# Patient Record
Sex: MALE | Race: BLACK OR AFRICAN AMERICAN | NOT HISPANIC OR LATINO | URBAN - METROPOLITAN AREA
[De-identification: names, ages, dates, MRNs, and addresses within clinical notes are randomized per-mention and may not be internally consistent; named-entity substitution may affect disease eponyms.]

---

## 2017-09-10 ENCOUNTER — EMERGENCY (EMERGENCY)
Facility: HOSPITAL | Age: 27
LOS: 1 days | Discharge: HOME | End: 2017-09-10
Attending: EMERGENCY MEDICINE | Admitting: EMERGENCY MEDICINE
Payer: MEDICAID

## 2017-09-10 VITALS
RESPIRATION RATE: 15 BRPM | TEMPERATURE: 98 F | DIASTOLIC BLOOD PRESSURE: 61 MMHG | HEART RATE: 62 BPM | SYSTOLIC BLOOD PRESSURE: 117 MMHG | OXYGEN SATURATION: 99 %

## 2017-09-10 VITALS
HEART RATE: 62 BPM | OXYGEN SATURATION: 100 % | SYSTOLIC BLOOD PRESSURE: 137 MMHG | RESPIRATION RATE: 18 BRPM | TEMPERATURE: 98 F | DIASTOLIC BLOOD PRESSURE: 84 MMHG

## 2017-09-10 PROCEDURE — 99283 EMERGENCY DEPT VISIT LOW MDM: CPT | Mod: 25

## 2017-09-10 RX ORDER — OXYCODONE AND ACETAMINOPHEN 5; 325 MG/1; MG/1
1 TABLET ORAL ONCE
Qty: 0 | Refills: 0 | Status: DISCONTINUED | OUTPATIENT
Start: 2017-09-10 | End: 2017-09-10

## 2017-09-10 RX ADMIN — OXYCODONE AND ACETAMINOPHEN 1 TABLET(S): 5; 325 TABLET ORAL at 06:42

## 2017-09-10 NOTE — ED PROVIDER NOTE - OBJECTIVE STATEMENT
25 yo M with history of asthma presenting with left upper molar pain x 2 weeks. Pt tried aspirin with minimal relief. Denies fevers, chills, cough, rhinorrhea, otorrhea, otalgia, nausea, vomiting, constipation, diarrhea, chest pain, shortness of breath or changes in urinary habits.

## 2017-09-10 NOTE — ED ADULT NURSE NOTE - OBJECTIVE STATEMENT
Pt received AxOx4, ambulatory, c/o toothache on the L upper jaw. Pt states the pain woke him up earlier today. Denies any other pain/discomfort at this time. ED providers are at bedside evaluating. No acute distress noted at this time. VS are as noted. Will continue to monitor.

## 2017-09-10 NOTE — ED PROVIDER NOTE - ATTENDING CONTRIBUTION TO CARE
26M with pmh of asthma presents with L upper molar pain x 2 weeks. Without f/c, n/v/d, trismus, diff swallowing, sob, neck pain or sore throat. PE significant for poor dentition, no palpable ttp, no palpable fluctuance concerning for abscess, oropharynx clear without exudate, no lymphadenopathy or neck ttp, no trismus. Without findings concerning for abscess or other acute dental complication. Will give analgesia, have f/u with dental, discussed proper follow-up and indications to return to ED. - Sung Holland MD

## 2017-09-18 ENCOUNTER — EMERGENCY (EMERGENCY)
Facility: HOSPITAL | Age: 27
LOS: 1 days | Discharge: HOME | End: 2017-09-18
Admitting: EMERGENCY MEDICINE
Payer: MEDICAID

## 2017-09-18 VITALS
TEMPERATURE: 98 F | RESPIRATION RATE: 16 BRPM | SYSTOLIC BLOOD PRESSURE: 127 MMHG | DIASTOLIC BLOOD PRESSURE: 72 MMHG | OXYGEN SATURATION: 100 % | HEART RATE: 54 BPM

## 2017-09-18 PROCEDURE — 99284 EMERGENCY DEPT VISIT MOD MDM: CPT | Mod: 25

## 2017-09-18 RX ORDER — OXYCODONE AND ACETAMINOPHEN 5; 325 MG/1; MG/1
1 TABLET ORAL ONCE
Qty: 0 | Refills: 0 | Status: DISCONTINUED | OUTPATIENT
Start: 2017-09-18 | End: 2017-09-18

## 2017-09-18 RX ADMIN — OXYCODONE AND ACETAMINOPHEN 1 TABLET(S): 5; 325 TABLET ORAL at 03:06

## 2017-09-18 NOTE — ED PROVIDER NOTE - NS ED MD DISPO DISCHARGE CCDA
Apollo Hernandes  [address] [facility] [id number]            9/6/2017      Dear Apollo,    This letter is a reminder that you are due for a colonoscopy. I would like to share some important information about colorectal cancer. Colorectal cancer is a leading cause of cancer death in this country. Colorectal cancer can be stopped in its tracks or detected early with preventative testing. Please call [phone] to schedule an appointment with Dr. Self, Dr. Ulrich, Dr. Cervantes, or Dr. Acosta which may be scheduled at [facility] in [place] or [facility] in [place].    Your good health is important to us-colorectal cancer screening is important for you.     Sincerely,    Dr. Isaías Cervantes M.D.  Gastroenterology  [facility]  [address] [place] [address]   Patient/Caregiver provided printed discharge information.

## 2017-09-18 NOTE — ED PROVIDER NOTE - PROGRESS NOTE DETAILS
MONISHA PUCKETT:  No prescribed narcotics noted on I stop Reference Number: [id number].  Pt medically stable for discharge. Pt to follow up in dental clinic at his appointment at [facility].

## 2017-09-18 NOTE — ED PROVIDER NOTE - PLAN OF CARE
Rest, drink plenty of fluids.  Advance activity as tolerated.  Continue all previously prescribed medications as directed.  Follow up at your dental clinic appointment today --  bring copies of your results.  Return to the ER for worsening or persistent symptoms, including but not limited to fevers, chills, facial swelling, and/or ANY NEW OR CONCERNING SYMPTOMS. If you have issues obtaining follow up, please call: [phone] to obtain a doctor or specialist who takes your insurance in your area.

## 2017-09-18 NOTE — ED PROVIDER NOTE - MEDICAL DECISION MAKING DETAILS
Pt is a 27 y/o M smoker no PMHx p/w dental pain x 4 days -- dental caries; no e/o dental abscess -- pain control, extraction today at [facility]

## 2017-09-18 NOTE — ED PROVIDER NOTE - CARE PLAN
Principal Discharge DX:	Dental caries  Instructions for follow-up, activity and diet:	Rest, drink plenty of fluids.  Advance activity as tolerated.  Continue all previously prescribed medications as directed.  Follow up at your dental clinic appointment today --  bring copies of your results.  Return to the ER for worsening or persistent symptoms, including but not limited to fevers, chills, facial swelling, and/or ANY NEW OR CONCERNING SYMPTOMS. If you have issues obtaining follow up, please call: [phone] to obtain a doctor or specialist who takes your insurance in your area.

## 2017-09-18 NOTE — ED PROVIDER NOTE - OBJECTIVE STATEMENT
Pt is a 27 y/o M smoker no PMHx p/w dental pain x 4 days.  Pt notes chronic pain (several months) at two teeth (left maxillary).  Pt notes exacerbation of pain prompting ED visit 1 week ago.  Pt notes recurrence of pain for past 4 days.  Three days ago, pt went to [facility] ED and is scheduled to have extraction at [facility] at [facility] today.  Pt states pain has become worse this evening, despite take ibuprofen.  Denies any fevers, chills, throat pain, gum swelling, facial swelling, trauma.

## 2017-09-18 NOTE — ED ADULT TRIAGE NOTE - CHIEF COMPLAINT QUOTE
Pt comes in for c/o left side facial/dental pain that has been going on for a while. Pt appears uncomfortable in triage, holding face, vs as noted.

## 2017-09-18 NOTE — ED PROVIDER NOTE - CHPI ED SYMPTOMS NEG
no decreased eating/drinking/no headache/no nasal congestion/no mouth sores/no fever/no ear pain/no bleeding gums

## 2018-04-01 ENCOUNTER — EMERGENCY (EMERGENCY)
Facility: HOSPITAL | Age: 28
LOS: 1 days | Discharge: HOME | End: 2018-04-01
Attending: EMERGENCY MEDICINE | Admitting: EMERGENCY MEDICINE
Payer: MEDICAID

## 2018-04-01 VITALS
RESPIRATION RATE: 18 BRPM | OXYGEN SATURATION: 99 % | HEART RATE: 67 BPM | DIASTOLIC BLOOD PRESSURE: 72 MMHG | SYSTOLIC BLOOD PRESSURE: 117 MMHG | TEMPERATURE: 98 F

## 2018-04-01 PROCEDURE — 99283 EMERGENCY DEPT VISIT LOW MDM: CPT | Mod: 25

## 2018-04-01 RX ORDER — IPRATROPIUM/ALBUTEROL SULFATE 18-103MCG
3 AEROSOL WITH ADAPTER (GRAM) INHALATION ONCE
Qty: 0 | Refills: 0 | Status: COMPLETED | OUTPATIENT
Start: 2018-04-01 | End: 2018-04-01

## 2018-04-01 RX ADMIN — Medication 5 MILLIGRAM(S): at 07:55

## 2018-04-01 RX ADMIN — Medication 3 MILLILITER(S): at 07:55

## 2018-04-01 NOTE — ED PROVIDER NOTE - MEDICAL DECISION MAKING DETAILS
26yo M with PMH asthma presenting with 2 days of chest tightness and wheezing awakening him from sleep this AM, no controller medications at home, currently breathing comfortably w/o wheezing, Duoneb, provide albuterol inhaler, asthma education, home.

## 2018-04-01 NOTE — ED ADULT NURSE NOTE - OBJECTIVE STATEMENT
26yo M with PMH asthma presenting with 2 days of chest tightness and wheezing awakening him from sleep never requiring intubation or hospitalization. lost his inhaler.

## 2018-04-01 NOTE — ED PROVIDER NOTE - ATTENDING CONTRIBUTION TO CARE
26 y/o M with h/o mild, intermittent asthma (no previous hospitalizations) on albuterol prn here with chest tightness similar to previous asthma exacerbations.  No fever, cough, cp, back pain, leg pain or swelling.  Pt states he ran out of his inhaler.  No travel, sick contacts, or recent illness.  Well appearing, lying comfortably in stretcher, awake and alert, nontoxic.  VSS.  Pt is not hypoxic, no tahcypnea, speaking in full sentences, there is no increased work of breathing.  Lungs cta bl with good air entry and very mild exp wheeze to upper lung fields.  Cards nl S1/S2, RRR, no MRG.  Abd soft ntnd.  No pedal edema or calf tenderness.  Mild asthma exacerbation, no infectious complaints concerning for underlying pna.  Will give albuterol, steroid course, outpatient follow-up.

## 2018-04-01 NOTE — ED ADULT TRIAGE NOTE - CHIEF COMPLAINT QUOTE
asthma    pt states lost his mdi...feels tight for a few days.  lungs cta. oliver cute distress.. able to speak in full sentneces

## 2018-04-01 NOTE — ED PROVIDER NOTE - CHPI ED SYMPTOMS NEG
no body aches/no chest pain/no cough/no hemoptysis/no shortness of breath/no chills/no edema/no headache/no diaphoresis

## 2018-04-01 NOTE — ED PROVIDER NOTE - OBJECTIVE STATEMENT
28yo M with PMH asthma presenting with 2 days of chest tightness and wheezing awakening him from sleep this AM. He has had minor exacerbations in the past, never requiring intubation or hospitalization. He is on no medications at home, reporting that he lost his inhaler. He has no PMD or pulmonologist. He denies tobacco/marijuana smoking or clear triggers for asthma. 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 He denies cough, fevers, chills, shortness of breath currently, wheezing currently, LE pain or swelling, history of heart problems, family history of heart problems.

## 2018-04-01 NOTE — ED PROVIDER NOTE - CONSTITUTIONAL, MLM
normal... well appearing sitting up in bed, speaking in full sentences, breathing comfortably, malodorous

## 2018-04-08 ENCOUNTER — EMERGENCY (EMERGENCY)
Facility: HOSPITAL | Age: 28
LOS: 1 days | Discharge: HOME | End: 2018-04-08
Attending: EMERGENCY MEDICINE | Admitting: EMERGENCY MEDICINE
Payer: MEDICAID

## 2018-04-08 VITALS
HEART RATE: 75 BPM | RESPIRATION RATE: 18 BRPM | OXYGEN SATURATION: 95 % | TEMPERATURE: 98 F | DIASTOLIC BLOOD PRESSURE: 76 MMHG | SYSTOLIC BLOOD PRESSURE: 119 MMHG

## 2018-04-08 DIAGNOSIS — K08.89 OTHER SPECIFIED DISORDERS OF TEETH AND SUPPORTING STRUCTURES: ICD-10-CM

## 2018-04-08 PROCEDURE — 99283 EMERGENCY DEPT VISIT LOW MDM: CPT | Mod: 25

## 2018-04-08 RX ORDER — IBUPROFEN 200 MG
600 TABLET ORAL ONCE
Qty: 0 | Refills: 0 | Status: COMPLETED | OUTPATIENT
Start: 2018-04-08 | End: 2018-04-08

## 2018-04-08 RX ORDER — OXYCODONE AND ACETAMINOPHEN 5; 325 MG/1; MG/1
1 TABLET ORAL ONCE
Qty: 0 | Refills: 0 | Status: DISCONTINUED | OUTPATIENT
Start: 2018-04-08 | End: 2018-04-08

## 2018-04-08 RX ADMIN — OXYCODONE AND ACETAMINOPHEN 1 TABLET(S): 5; 325 TABLET ORAL at 09:39

## 2018-04-08 RX ADMIN — Medication 600 MILLIGRAM(S): at 09:39

## 2018-04-08 NOTE — ED PROVIDER NOTE - PHYSICAL EXAMINATION
GEN: Well appearing, well nourished, awake, alert, oriented to person, place, time/situation and in no apparent distress.  ENT: Airway patent, Nasal mucosa clear. Mouth with normal mucosa.  +Decay to tooth #18.  No abscess.   EYES: Clear bilaterally.  RESPIRATORY: Breathing comfortably with normal RR.  MSK: Range of motion is not limited, no deformities noted.  NEURO: Alert and oriented, no focal deficits.  SKIN: Skin normal color for race, warm, dry and intact. No evidence of rash.  PSYCH: Alert and oriented to person, place, time/situation. normal mood and affect. no apparent risk to self or others.

## 2018-04-08 NOTE — ED PROVIDER NOTE - OBJECTIVE STATEMENT
Pt is a 28yo M with no PMH who reports pain to L lower posterior molar x 2 days.  Denies any swelling or bleeding.  Pain is dull ache and does not radiate.  Worse with any pressure.  Denies taking any pain medication.

## 2018-04-08 NOTE — ED PROVIDER NOTE - MEDICAL DECISION MAKING DETAILS
Toothache.  no e/o abscess.  Pt has not been evaluated by dentist.  Will give pain medication now and will have him f/u with [facility] walk in dental clinic tomorrow morning at 830am.  Pt understands he must show up early because it's first come first serve and that failure to f/u will results in prolonged pain and possible infection.

## 2018-04-09 ENCOUNTER — EMERGENCY (EMERGENCY)
Facility: HOSPITAL | Age: 28
LOS: 1 days | Discharge: HOME | End: 2018-04-09
Attending: EMERGENCY MEDICINE | Admitting: EMERGENCY MEDICINE
Payer: MEDICAID

## 2018-04-09 VITALS
HEART RATE: 63 BPM | DIASTOLIC BLOOD PRESSURE: 79 MMHG | RESPIRATION RATE: 17 BRPM | SYSTOLIC BLOOD PRESSURE: 121 MMHG | TEMPERATURE: 98 F | OXYGEN SATURATION: 97 %

## 2018-04-09 DIAGNOSIS — K02.9 DENTAL CARIES, UNSPECIFIED: ICD-10-CM

## 2018-04-09 DIAGNOSIS — Z91.010 ALLERGY TO PEANUTS: ICD-10-CM

## 2018-04-09 DIAGNOSIS — K08.89 OTHER SPECIFIED DISORDERS OF TEETH AND SUPPORTING STRUCTURES: ICD-10-CM

## 2018-04-09 PROCEDURE — 99283 EMERGENCY DEPT VISIT LOW MDM: CPT | Mod: 25

## 2018-04-09 RX ORDER — IBUPROFEN 200 MG
600 TABLET ORAL ONCE
Qty: 0 | Refills: 0 | Status: COMPLETED | OUTPATIENT
Start: 2018-04-09 | End: 2018-04-09

## 2018-04-09 RX ORDER — OXYCODONE AND ACETAMINOPHEN 5; 325 MG/1; MG/1
1 TABLET ORAL ONCE
Qty: 0 | Refills: 0 | Status: DISCONTINUED | OUTPATIENT
Start: 2018-04-09 | End: 2018-04-09

## 2018-04-09 RX ORDER — OXYCODONE HYDROCHLORIDE 5 MG/1
5 TABLET ORAL ONCE
Qty: 0 | Refills: 0 | Status: DISCONTINUED | OUTPATIENT
Start: 2018-04-09 | End: 2018-04-09

## 2018-04-09 RX ADMIN — OXYCODONE AND ACETAMINOPHEN 1 TABLET(S): 5; 325 TABLET ORAL at 04:42

## 2018-04-09 RX ADMIN — Medication 600 MILLIGRAM(S): at 04:42

## 2018-04-09 NOTE — ED PROVIDER NOTE - OBJECTIVE STATEMENT
27 yom pw dental pain, lower posterior molar, unchanged from yesterday, unable to see dental at [facility] as clinic closed.  improved after ibuprofen and oxy yesterday.  no fc.  no bleeding or discharge.

## 2018-04-09 NOTE — ED PROVIDER NOTE - PHYSICAL EXAMINATION
CON: ao x 3, HENMT: clear oropharynx, soft neck, tooth #18 decay noted, no gum swelling or abscess on exam, HEAD: atraumatic, MSK: no deformities

## 2018-04-09 NOTE — ED ADULT NURSE NOTE - CHPI ED SYMPTOMS NEG
no ear pain/no fever/no headache/no decreased eating/drinking/no mouth sores/no nasal congestion/no bleeding gums

## 2018-04-09 NOTE — ED PROVIDER NOTE - MEDICAL DECISION MAKING DETAILS
well appearing pt w/ unchanged dentalgia, has follow up info to [facility] dental, will give ibuprofen and oxycodone as sx relieved by such yesterday

## 2023-10-31 NOTE — ED ADULT NURSE NOTE - OBJECTIVE STATEMENT
Requested Prescriptions     Pending Prescriptions Disp Refills    allopurinol (ZYLOPRIM) 100 MG tablet [Pharmacy Med Name: ALLOPURINOL 100 MG TABLET] 60 tablet 5     Sig: TAKE TWO TABLETS BY MOUTH DAILY          Last Office Visit: 8/10/2023     Next Office Visit: Visit date not found     Last Labs: 8/10/2023 Pt presents to ED with c/o dental pain, seen here yesterday for same, afebrile.
